# Patient Record
Sex: MALE | Employment: FULL TIME | URBAN - METROPOLITAN AREA
[De-identification: names, ages, dates, MRNs, and addresses within clinical notes are randomized per-mention and may not be internally consistent; named-entity substitution may affect disease eponyms.]

---

## 2017-03-02 ENCOUNTER — OFFICE VISIT (OUTPATIENT)
Dept: INTERNAL MEDICINE CLINIC | Facility: CLINIC | Age: 37
End: 2017-03-02

## 2017-03-02 VITALS
DIASTOLIC BLOOD PRESSURE: 62 MMHG | HEIGHT: 71 IN | HEART RATE: 78 BPM | RESPIRATION RATE: 16 BRPM | WEIGHT: 236 LBS | SYSTOLIC BLOOD PRESSURE: 88 MMHG | OXYGEN SATURATION: 98 % | BODY MASS INDEX: 33.04 KG/M2 | TEMPERATURE: 98 F

## 2017-03-02 DIAGNOSIS — Z76.89 ENCOUNTER TO ESTABLISH CARE: ICD-10-CM

## 2017-03-02 DIAGNOSIS — J01.00 ACUTE MAXILLARY SINUSITIS, RECURRENCE NOT SPECIFIED: Primary | ICD-10-CM

## 2017-03-02 DIAGNOSIS — B30.9 VIRAL CONJUNCTIVITIS OF BOTH EYES: ICD-10-CM

## 2017-03-02 PROCEDURE — 99204 OFFICE O/P NEW MOD 45 MIN: CPT | Performed by: STUDENT IN AN ORGANIZED HEALTH CARE EDUCATION/TRAINING PROGRAM

## 2017-03-02 RX ORDER — TOBRAMYCIN 3 MG/ML
2 SOLUTION/ DROPS OPHTHALMIC EVERY 4 HOURS
Qty: 1 BOTTLE | Refills: 0 | Status: SHIPPED | OUTPATIENT
Start: 2017-03-02

## 2017-03-02 RX ORDER — PREDNISONE 20 MG/1
20 TABLET ORAL DAILY
Qty: 10 TABLET | Refills: 0 | Status: SHIPPED | OUTPATIENT
Start: 2017-03-02 | End: 2017-03-12

## 2017-03-02 RX ORDER — FLUTICASONE PROPIONATE 50 MCG
1 SPRAY, SUSPENSION (ML) NASAL DAILY
Qty: 1 BOTTLE | Refills: 0 | Status: SHIPPED | OUTPATIENT
Start: 2017-03-02 | End: 2017-03-09

## 2017-03-02 RX ORDER — AZITHROMYCIN 250 MG/1
TABLET, FILM COATED ORAL
Qty: 6 TABLET | Refills: 0 | Status: SHIPPED | OUTPATIENT
Start: 2017-03-02

## 2017-03-02 NOTE — PATIENT INSTRUCTIONS
Conjunctivitis, Viral    Viral conjunctivitis (sometimes called pink eye) is a common infection of the eye. It is very contagious. Touching the infected eye, then touching another person passes this infection.  It can also be spread from one eye to the ot · This illness is contagious during the first week. Children with this illness should be kept out of day care and school until the redness clears. Follow-up care  Follow up with your healthcare provider, or as advised.   When to seek medical advice  Call y · An expectorant containing guaifenesin may help thin the mucus and promote drainage from the sinuses. · Over-the-counter decongestants may be used unless a similar medicine was prescribed.  Nasal sprays work the fastest. Use one that contains phenylephrin © 0068-7894 [address], [address]. All rights reserved. This information is not intended as a substitute for professional medical care. Always follow your healthcare professional's instructions.

## 2017-03-02 NOTE — PROGRESS NOTES
HPI:    Patient ID: Eamon Wheeler is a 39year old male who present today to establish care. Patient also reports nasal/sinus congestion for the past 2 months. Sinus Problem  This is a chronic problem. Episode onset: Symptoms present since January.  Michelle Mcdonnell Skin: Negative. Allergic/Immunologic: Negative. Neurological: Positive for headaches. Negative for weakness. Hematological: Negative. Psychiatric/Behavioral: Negative.              Current Outpatient Prescriptions:  azithromycin (ZITHROMAX Z-PA Prednisone as directed. Flonase for nasal congestion as directed.     Viral conjunctivitis of both eyes  -Discussed condition with the patient, expect resolution in another week   -Recommend cool compresses for symptomatic relief   -Recommend preservative f

## (undated) NOTE — MR AVS SNAPSHOT
[facility]  [address] [id number] [phone]               Thank you for choosing us for your health care visit with Lay Marquis MD.  We are glad to serve you and happy to provide you with this s · Launder cloths used to clean the eye after one use. Do not reuse them. · If antibiotic medicines are prescribed, take them exactly as directed. Do not stop taking them until you are told to.   · You may use acetaminophen or ibuprofen to control pain, unl tissue lining the sinus cavity. Sinus inflammation can occur during a cold. It can also be due to allergies to pollens and other particles in the air. Sinusitis can cause symptoms of sinus congestion and fullness.  A sinus infection causes fever, headache a medicine was prescribed. (If you have chronic liver or kidney disease or ever had a stomach ulcer, talk with your doctor before using these medicines. Aspirin should never be used in anyone under 25years of age who is ill with a fever.  It may cause severe Where to Get Your Medications      These medications were sent to [facility], [address] [facility] [address]  [address] , [phone], [address], [address] [place] [address]     Phone:  [phone] Water is best for hydration Fast food. Eat at home when possible     Tips for increasing your physical activity – Adults who are physically active are less likely to develop some chronic diseases than adults who are inactive.      HOW TO GET STARTED: HOW